# Patient Record
Sex: FEMALE | Race: WHITE | Employment: OTHER | URBAN - METROPOLITAN AREA
[De-identification: names, ages, dates, MRNs, and addresses within clinical notes are randomized per-mention and may not be internally consistent; named-entity substitution may affect disease eponyms.]

---

## 2020-02-27 ENCOUNTER — TELEPHONE (OUTPATIENT)
Dept: PHARMACY | Age: 85
End: 2020-02-27

## 2020-02-27 NOTE — TELEPHONE ENCOUNTER
Patient called to schedule an appt with our [facility] clinic. Patient states she has been on warfarin for \"years\". Was see @ [facility] on 2/24 and states her INR was 2.6. She was advised to RTC on 4/6 for a recheck. Her daughter needs to bring her so, she needs appts after 4:30pm. Currently taking 5mg warfarin daily. Explained that we needed to get a signed order from their current doctor. Confirmed/Updated patient's address/phone & PCP in [facility]. Let the patient know we would contact to schedule as soon as the signed order is received from their PCP.

## 2020-04-02 ENCOUNTER — TELEPHONE (OUTPATIENT)
Dept: PHARMACY | Age: 85
End: 2020-04-02

## 2020-04-02 NOTE — TELEPHONE ENCOUNTER
Called patient. Due to COVID-19, our clinic is converting to telephone appointments. I am calling to adjust your visit to a telephone/virtual visit for your warfarin blood thinner INR check and dosing. We are trying to minimize the number of people that are coming into the facilities and help prevent exposure for both yourself and our team. Because you wont be coming in to the clinic, we are asking that you go to the laboratory draw site to have your INR checked. We are asking that you get your lab drawn on the day of your appointment (time doesn't matter as long as you go during hours of operation), so please go sometime 4/7 to the lab to get your INR drawn. Then we will be contacting you via telephone with results and we will conduct the whole appointment over the telephone. Patient has selected [facility] location to get their INR drawn.     Erika Johnson, PharmD, [facility]

## 2020-04-07 LAB — INR BLD: 1.7

## 2020-04-08 ENCOUNTER — ANTI-COAG VISIT (OUTPATIENT)
Dept: PHARMACY | Age: 85
End: 2020-04-08
Payer: MEDICARE

## 2020-04-08 PROBLEM — F41.1 GENERALIZED ANXIETY DISORDER: Status: ACTIVE | Noted: 2020-04-08

## 2020-04-08 PROBLEM — F32.A DEPRESSIVE DISORDER: Status: ACTIVE | Noted: 2020-04-08

## 2020-04-08 PROBLEM — E06.3 HASHIMOTO'S THYROIDITIS: Status: ACTIVE | Noted: 2017-04-12

## 2020-04-08 PROCEDURE — 99211 OFF/OP EST MAY X REQ PHY/QHP: CPT

## 2020-04-08 RX ORDER — SERTRALINE HYDROCHLORIDE 100 MG/1
1 TABLET, FILM COATED ORAL DAILY
COMMUNITY

## 2020-04-08 RX ORDER — HYDROCHLOROTHIAZIDE 25 MG/1
1 TABLET ORAL DAILY
COMMUNITY
Start: 2020-02-25

## 2020-04-08 RX ORDER — LANOLIN ALCOHOL/MO/W.PET/CERES
3 CREAM (GRAM) TOPICAL NIGHTLY PRN
COMMUNITY

## 2020-04-08 RX ORDER — LOSARTAN POTASSIUM 50 MG/1
50 TABLET ORAL DAILY
COMMUNITY

## 2020-04-08 RX ORDER — ESOMEPRAZOLE MAGNESIUM 40 MG/1
40 CAPSULE, DELAYED RELEASE ORAL DAILY
COMMUNITY
Start: 2016-10-11

## 2020-04-08 NOTE — PROGRESS NOTES
Ms. Kathleen Luna is a 80 y.o. y/o female with history of Afib who presents today for anticoagulation monitoring and adjustment. Pertinent PMH: HTN, CHF, hx GI bleed, anemia, Hashimoto's, hx of HIT  Hx patient of [facility]  Patient Reported Findings:  Yes     No  [x]   []       Patient verifies current dosing regimen as listed states that she has been taking 5 mg daily   []   [x]       S/S bleeding/bruising/swelling/SOB  []   [x]       Blood in urine or stool  []   [x]       Procedures scheduled in the future at this time  []   [x]       Missed Dose  []   [x]       Extra Dose  [x]   []       Change in medications reviewed and updated medication list   [x]   []       Change in health/diet/appetite states that she normally eats vit k vegetables regularly but states recently d/t COVID-19 has not been going to store so has not been eating vit k recently   []   [x]       Change in alcohol use  []   [x]       Change in activity  []   [x]       Hospital admission  []   [x]       Emergency department visit  []   [x]       Other complaints    Clinical Outcomes:  Yes     No  []   [x]       Major bleeding event  []   [x]       Thromboembolic event    Duration of warfarin Therapy: indefinite  INR Range:  2.0-3.0    Educated patient of signs and symptoms of bleeding and clotting, when to seek emergent care, the need to maintain a consistent diet and notification of clinic for any new medications including over the counter medications or abnormal bleeding. Patient acknowledges working in consult agreement with pharmacist as referred by his/her physician. Most recent INR on 2/24 was 2.6. Was documented to be taking 5 mg daily     INR was 1.7 yesterday via venipuncture at Dr. Ang Corey office  Since was hx therapeutic at this weekly dose, advised for patient to take 7.5 mg tonight then continue 5 mg daily. Encouraged to maintain a consistency of vegetables/salads.   Recheck INR in 3 weeks, 4/29 at Dr. Sayra Conde

## 2020-04-28 LAB — INR BLD: 1.6

## 2020-04-29 ENCOUNTER — ANTI-COAG VISIT (OUTPATIENT)
Dept: PHARMACY | Age: 85
End: 2020-04-29
Payer: MEDICARE

## 2020-04-29 PROCEDURE — 99211 OFF/OP EST MAY X REQ PHY/QHP: CPT

## 2020-04-29 NOTE — PROGRESS NOTES
Ms. Jaqui Sampson is a 80 y.o. y/o female with history of Afib who presents today for anticoagulation monitoring and adjustment. Pertinent PMH: HTN, CHF, hx GI bleed, anemia, Hashimoto's, hx of HIT  Hx patient of [facility] clinic  Patient Reported Findings:  Yes     No  [x]   []       Patient verifies current dosing regimen as listed states that she has been taking 5 mg daily   [x]   []       S/S bleeding/bruising/swelling/SOB states that she fell last week and hurt her back. Did not hit head. Went to MD to discuss. []   [x]       Blood in urine or stool  [x]   [x]       Procedures scheduled in the future at this time is going to be using a holter monitor d/t fall to determine if a fib   []   [x]       Missed Dose  []   [x]       Extra Dose  [x]   []       Change in medications states that she took tylenol last week after fall but has stopped    []   [x]       Change in health/diet/appetite states that she normally eats vit k vegetables regularly but states recently d/t COVID-19 has not been going to store so has not been eating vit k recently  --> continues   []   [x]       Change in alcohol use  []   [x]       Change in activity  []   [x]       Hospital admission  []   [x]       Emergency department visit  []   [x]       Other complaints    Clinical Outcomes:  Yes     No  []   [x]       Major bleeding event  []   [x]       Thromboembolic event    Duration of warfarin Therapy: indefinite  INR Range:  2.0-3.0     RESULTS from [facility] on 4/28/20, INR of 1.6  PT of  16.3     INR was 1.6 yesterday via venipuncture at Dr. Kera Hou office  Since subtherapeutic again, advised for patient to take 7.5 mg tonight then increase weekly dose to 7.5 mg on Fri and 5 mg all other days of the week (7% inc)  Encouraged to maintain a consistency of vegetables/salads.   Recheck INR in 2 weeks, 5/12 at Dr. Aleisha Adam     Referring Dr. Aleisha Adam  INR (no units)   Date Value   04/28/2020 1.60   04/07/2020 1.70   10/03/2014 2.4

## 2020-05-12 LAB — INR BLD: 1.7

## 2020-05-13 ENCOUNTER — ANTI-COAG VISIT (OUTPATIENT)
Dept: PHARMACY | Age: 85
End: 2020-05-13
Payer: MEDICARE

## 2020-05-13 PROCEDURE — 99211 OFF/OP EST MAY X REQ PHY/QHP: CPT

## 2020-05-13 NOTE — PROGRESS NOTES
No  Total # of Interventions Recommended: 1  - Increased Dose #: 1  - Maintenance Safety Lab Monitoring #: 1  Total Interventions Accepted: 1  Time Spent (min): 15    Lelia CadeD

## 2020-06-02 ENCOUNTER — TELEPHONE (OUTPATIENT)
Dept: PHARMACY | Age: 85
End: 2020-06-02

## 2020-06-02 LAB — INR BLD: 2.1

## 2020-06-02 NOTE — TELEPHONE ENCOUNTER
Called to see if pt was having INR checked at Dr Sayra Conde office today, s/w her  she plans on going this afternoon.

## 2020-06-03 ENCOUNTER — ANTI-COAG VISIT (OUTPATIENT)
Dept: PHARMACY | Age: 85
End: 2020-06-03
Payer: MEDICARE

## 2020-06-03 PROCEDURE — 99211 OFF/OP EST MAY X REQ PHY/QHP: CPT

## 2020-06-30 ENCOUNTER — ANTI-COAG VISIT (OUTPATIENT)
Dept: PHARMACY | Age: 85
End: 2020-06-30
Payer: MEDICARE

## 2020-06-30 VITALS — TEMPERATURE: 97.5 F

## 2020-06-30 LAB — INTERNATIONAL NORMALIZATION RATIO, POC: 1.9

## 2020-06-30 PROCEDURE — 99211 OFF/OP EST MAY X REQ PHY/QHP: CPT

## 2020-06-30 PROCEDURE — 85610 PROTHROMBIN TIME: CPT

## 2020-06-30 RX ORDER — OXYBUTYNIN CHLORIDE 5 MG/1
0.5 TABLET ORAL 2 TIMES DAILY
COMMUNITY
Start: 2020-01-03

## 2020-06-30 RX ORDER — FLUTICASONE PROPIONATE 50 MCG
1 SPRAY, SUSPENSION (ML) NASAL DAILY
COMMUNITY
Start: 2020-05-26

## 2020-06-30 RX ORDER — AZITHROMYCIN 500 MG/1
1 TABLET, FILM COATED ORAL DAILY
COMMUNITY

## 2020-06-30 NOTE — PROGRESS NOTES
Only    PHSO: No  Total # of Interventions Recommended: 4  - Increased Dose #: 1  - Updated Order #: 3 Updated Order Reason(s):  Other  - Maintenance Safety Lab Monitoring #: 1  Total Interventions Accepted: 4  Time Spent (min): 20    Lelia SinclairD

## 2020-07-13 ENCOUNTER — TELEPHONE (OUTPATIENT)
Dept: PHARMACY | Age: 85
End: 2020-07-13

## 2020-07-13 NOTE — TELEPHONE ENCOUNTER
Patient called to say she is having vaginal bleeding and wants to know what she should do about her warfarin. [facility] advised to contact her PCP. I let patient know and she will call us back once she speaks to her PCP.

## 2020-07-14 ENCOUNTER — ANTI-COAG VISIT (OUTPATIENT)
Dept: PHARMACY | Age: 85
End: 2020-07-14
Payer: MEDICARE

## 2020-07-14 LAB — INTERNATIONAL NORMALIZATION RATIO, POC: 2.9

## 2020-07-14 PROCEDURE — 99211 OFF/OP EST MAY X REQ PHY/QHP: CPT

## 2020-07-14 PROCEDURE — 85610 PROTHROMBIN TIME: CPT

## 2020-07-14 NOTE — PROGRESS NOTES
PHARMACY CONSULT: MED RECONCILIATION/REVIEW ADDENDUM    For Pharmacy Admin Tracking Only    PHSO: No  Total # of Interventions Recommended: 0  - Maintenance Safety Lab Monitoring #: 1  Total Interventions Accepted: 0  Time Spent (min): 15    Chris Lance, PharmD

## 2020-08-04 ENCOUNTER — ANTI-COAG VISIT (OUTPATIENT)
Dept: PHARMACY | Age: 85
End: 2020-08-04
Payer: MEDICARE

## 2020-08-04 VITALS — TEMPERATURE: 97.5 F

## 2020-08-04 LAB — INTERNATIONAL NORMALIZATION RATIO, POC: 3.3

## 2020-08-04 PROCEDURE — 85610 PROTHROMBIN TIME: CPT

## 2020-08-04 PROCEDURE — 99211 OFF/OP EST MAY X REQ PHY/QHP: CPT

## 2020-08-04 NOTE — PROGRESS NOTES
Ms. Kendra Mcgregor is a 80 y.o. y/o female with history of Afib who presents today for anticoagulation monitoring and adjustment. Pertinent PMH: HTN, CHF, hx GI bleed, anemia, Hashimoto's, hx of HIT, mitral valve replacement (was managed at 2-3 hx at [address] and MD signed for 2-3)  Hx patient of [facility] clinic  Patient Reported Findings:  Yes     No  [x]   []       Patient verifies current dosing regimen as listed   [x]   []       S/S bleeding/bruising/swelling/SOB pt called yesterday because bleeding vaginally started Friday until today. Went to see PCP. Is going to be getting US of uterus to determine possible etiology---> denies     []   [x]       Blood in urine or stool-denies   []   [x]       Procedures scheduled in the future at this time is going to be using a holter monitor d/t fall to determine if a fib   []   [x]       Missed Dose-denies   []   [x]       Extra Dose- denies   []   [x]       Change in medications -synthroid inc last week   []   [x]       Change in health/diet/appetite states that she normally eats vit k vegetables regularly but states recently d/t COVID-19 has not been going to store so has not been eating vit k recently  --> states that she has been eating greens every day, kale and greens---> no changes---> thinks less greens, no NVD   []   [x]       Change in alcohol use  []   [x]       Change in activity  []   [x]       Hospital admission  []   [x]       Emergency department visit  []   [x]       Other complaints    Clinical Outcomes:  Yes     No  []   [x]       Major bleeding event  []   [x]       Thromboembolic event    Duration of warfarin Therapy: indefinite  INR Range:  2.0-3.0     INR is 3.3 today dt less greens -will go back to normal. If elevated again at next visit then decrease dose. Take 2.5mg tonight then continue weekly dose of 7.5 mg on Mon Wed and Fri and 5 mg all other days of the week.    Encouraged to maintain a consistency of vegetables/salads.   Recheck INR in 3 weeks, 8/25 per pt request     Referring Dr. Tamir Tolbert  INR (no units)   Date Value   08/04/2020 3.3   07/14/2020 2.9   06/30/2020 1.9   06/02/2020 2.10   05/12/2020 1.70   04/28/2020 1.60   04/07/2020 1.70     CLINICAL PHARMACY CONSULT: MED RECONCILIATION/REVIEW ADDENDUM    For Pharmacy Admin Tracking Only    PHSO: No  Total # of Interventions Recommended: 1  - Decreased Dose #: 1  - Maintenance Safety Lab Monitoring #: 1  Total Interventions Accepted: 1  Time Spent (min): Via Halina Glass, PharmD

## 2020-08-25 ENCOUNTER — ANTI-COAG VISIT (OUTPATIENT)
Dept: PHARMACY | Age: 85
End: 2020-08-25
Payer: MEDICARE

## 2020-08-25 VITALS — TEMPERATURE: 97.5 F

## 2020-08-25 LAB — INTERNATIONAL NORMALIZATION RATIO, POC: 3.4

## 2020-08-25 PROCEDURE — 85610 PROTHROMBIN TIME: CPT

## 2020-08-25 PROCEDURE — 99211 OFF/OP EST MAY X REQ PHY/QHP: CPT

## 2020-08-25 NOTE — PROGRESS NOTES
Ms. Cornelius Moses is a 80 y.o. y/o female with history of Afib who presents today for anticoagulation monitoring and adjustment. Pertinent PMH: HTN, CHF, hx GI bleed, anemia, Hashimoto's, hx of HIT, mitral valve replacement (was managed at 2-3 hx at [address] and MD signed for 2-3)  Hx patient of [facility] clinic  Patient Reported Findings:  Yes     No  [x]   []       Patient verifies current dosing regimen as listed   [x]   []       S/S bleeding/bruising/swelling/SOB pt called yesterday because bleeding vaginally started Friday until today. Went to see PCP.  Is going to be getting US of uterus to determine possible etiology---> denies     []   [x]       Blood in urine or stool-denies---> denied, when asked about the doctor's note about blood in urine she said the [address] showed that it was an unremarkable uterus and that the report showed no cancer so they didn't do an internal exam and hasn't had any bleeding in past month    []   [x]       Procedures scheduled in the future at this time is going to be using a holter monitor d/t fall to determine if a fib   []   [x]       Missed Dose-denies   []   [x]       Extra Dose- denies   []   [x]       Change in medications -synthroid inc---> no changes    []   [x]       Change in health/diet/appetite states that she normally eats vit k vegetables regularly but states recently d/t COVID-19 has not been going to store so has not been eating vit k recently  --> states that she has been eating greens every day, kale and greens---> no changes---> thinks less greens, no NVD---> thinks eating greens but not many high content vit K foods recently, no NVD   []   [x]       Change in alcohol use- denies   []   [x]       Change in activity  []   [x]       Hospital admission  []   [x]       Emergency department visit  []   [x]       Other complaints    Clinical Outcomes:  Yes     No  []   [x]       Major bleeding event  []   [x]       Thromboembolic event    Duration of warfarin Therapy: indefinite  INR Range:  2.0-3.0     INR is 3.4 today   INR has been elevated recently, will decrease dose. Hold tonight then decrease dose to 7.5 mg on Mon and Fri and 5 mg all other days of the week (5.9%). Encouraged to maintain a consistency of vegetables/salads.   Recheck INR in 2 weeks, 9/8    Referring Dr. Tamir Tolbert  INR (no units)   Date Value   08/25/2020 3.4   08/04/2020 3.3   07/14/2020 2.9   06/30/2020 1.9   06/02/2020 2.10   05/12/2020 1.70   04/28/2020 1.60   04/07/2020 1.70     CLINICAL PHARMACY CONSULT: MED RECONCILIATION/REVIEW ADDENDUM    For Pharmacy Admin Tracking Only    PHSO: No  Total # of Interventions Recommended: 1  - Decreased Dose #: 1  - Maintenance Safety Lab Monitoring #: 1  Total Interventions Accepted: 1  Time Spent (min): Via Halina Glass, PharmD

## 2020-09-08 ENCOUNTER — TELEPHONE (OUTPATIENT)
Dept: PHARMACY | Age: 85
End: 2020-09-08

## 2020-09-10 NOTE — TELEPHONE ENCOUNTER
Called patient to reschedule. Patient states that Dr. Daja Jerome has given her a prescription for Xarelto and she is starting that today. Dr has provided instructions for patient to switch. Explained that we would hold her file open for 30 days before discharging.

## 2020-10-16 ENCOUNTER — ANTI-COAG VISIT (OUTPATIENT)
Dept: PHARMACY | Age: 85
End: 2020-10-16

## 2020-10-16 NOTE — TELEPHONE ENCOUNTER
Called patient and spoke with patient. Confirmed that she remains on Xarelto and it is working well for her. Discharging patient from clinic.